# Patient Record
Sex: FEMALE | URBAN - METROPOLITAN AREA
[De-identification: names, ages, dates, MRNs, and addresses within clinical notes are randomized per-mention and may not be internally consistent; named-entity substitution may affect disease eponyms.]

---

## 2021-07-15 ENCOUNTER — APPOINTMENT (OUTPATIENT)
Dept: URBAN - METROPOLITAN AREA CLINIC 285 | Age: 21
Setting detail: DERMATOLOGY
End: 2021-07-15

## 2021-07-15 DIAGNOSIS — L24.2 IRRITANT CONTACT DERMATITIS DUE TO SOLVENTS: ICD-10-CM

## 2021-07-15 PROCEDURE — OTHER PRESCRIPTION: OTHER

## 2021-07-15 PROCEDURE — OTHER MIPS QUALITY: OTHER

## 2021-07-15 PROCEDURE — OTHER MEDICATION COUNSELING: OTHER

## 2021-07-15 PROCEDURE — OTHER TREATMENT REGIMEN: OTHER

## 2021-07-15 PROCEDURE — OTHER COUNSELING ALLERGENS: OTHER

## 2021-07-15 PROCEDURE — 99203 OFFICE O/P NEW LOW 30 MIN: CPT

## 2021-07-15 RX ORDER — BETAMETHASONE DIPROPIONATE 0.5 MG/G
OINTMENT TOPICAL
Qty: 1 | Refills: 0 | Status: ERX | COMMUNITY
Start: 2021-07-15

## 2021-07-15 RX ORDER — METHYLPREDNISOLONE 4 MG/1
TABLET ORAL
Qty: 1 | Refills: 0 | Status: ERX | COMMUNITY
Start: 2021-07-15

## 2021-07-15 ASSESSMENT — LOCATION ZONE DERM: LOCATION ZONE: ARM

## 2021-07-15 ASSESSMENT — LOCATION DETAILED DESCRIPTION DERM: LOCATION DETAILED: RIGHT PROXIMAL POSTERIOR UPPER ARM

## 2021-07-15 ASSESSMENT — LOCATION SIMPLE DESCRIPTION DERM: LOCATION SIMPLE: RIGHT POSTERIOR UPPER ARM

## 2021-07-15 NOTE — PROCEDURE: MEDICATION COUNSELING
Xelcatz Pregnancy And Lactation Text: This medication is Pregnancy Category D and is not considered safe during pregnancy.  The risk during breast feeding is also uncertain.

## 2021-07-15 NOTE — PROCEDURE: TREATMENT REGIMEN
Plan: Discussed how this may have resulted from cleanser and/or bandage used when patient had tattoo done.
Detail Level: Zone
Initiate Treatment: -betamethasone dipropionate 0.05 % topical ointment,Apply to affected areas on right upper arm twice daily , stop once clear\\n-Medrol (Bobby) 4 mg tablets in a dose pack , Take as directed

## 2022-06-01 NOTE — PROCEDURE: MEDICATION COUNSELING
Rhofade Counseling: Rhofade is a topical medication which can decrease superficial blood flow where applied. Side effects are uncommon and include stinging, redness and allergic reactions. Cartilage Graft Text: The defect edges were debeveled with a #15 scalpel blade.  Given the location of the defect, shape of the defect, the fact the defect involved a full thickness cartilage defect a cartilage graft was deemed most appropriate.  An appropriate donor site was identified, cleansed, and anesthetized. The cartilage graft was then harvested and transferred to the recipient site, oriented appropriately and then sutured into place.  The secondary defect was then repaired using a primary closure.

## 2024-03-23 NOTE — PROCEDURE: MEDICATION COUNSELING
1) Recommend continue with current diet, which remains appropriate at this time.   2) Monitor PO intake, Labs, weights, BMs, and skin integrity.   3) RD to remain available for further nutritional interventions as indicated.  Terbinafine Counseling: Patient counseling regarding adverse effects of terbinafine including but not limited to headache, diarrhea, rash, upset stomach, liver function test abnormalities, itching, taste/smell disturbance, nausea, abdominal pain, and flatulence.  There is a rare possibility of liver failure that can occur when taking terbinafine.  The patient understands that a baseline LFT and kidney function test may be required. The patient verbalized understanding of the proper use and possible adverse effects of terbinafine.  All of the patient's questions and concerns were addressed.

## 2024-07-01 NOTE — PROCEDURE: MEDICATION COUNSELING
Detail Level: Detailed Biopsy Photograph Reviewed: Yes Number Of Curettages: 3 Size Of Lesion In Cm: 0.8 Size Of Lesion After Curettage: 0 Add Intralesional Injection: No Concentration (Mg/Ml Or Millions Of Plaque Forming Units/Cc): 0.01 Total Volume (Ccs): 1 Anesthesia Type: 1% lidocaine with epinephrine and a 1:10 solution of 8.4% sodium bicarbonate Anesthesia Volume In Cc: 1.5 Cautery Type: electrodesiccation What Was Performed First?: Curettage Final Size Statement: The size of the lesion after curettage was Additional Information: (Optional): The wound was cleaned, and a pressure dressing was applied.  The patient received detailed post-op instructions. Consent was obtained from the patient. The risks, benefits and alternatives to therapy were discussed in detail. Specifically, the risks of infection, scarring, bleeding, prolonged wound healing, nerve injury, incomplete removal, allergy to anesthesia and recurrence were addressed. Alternatives to ED&C, such as: surgical removal and XRT were also discussed.  Prior to the procedure, the treatment site was clearly identified and confirmed by the patient. All components of Universal Protocol/PAUSE Rule completed. Post-Care Instructions: I reviewed with the patient in detail post-care instructions. Patient is to keep the area dry for 48 hours, and not to engage in any swimming until the area is healed. Should the patient develop any fevers, chills, bleeding, severe pain patient will contact the office immediately. Bill As A Line Item Or As Units: Line Item Topical Sulfur Applications Counseling: Topical Sulfur Counseling: Patient counseled that this medication may cause skin irritation or allergic reactions.  In the event of skin irritation, the patient was advised to reduce the amount of the drug applied or use it less frequently.   The patient verbalized understanding of the proper use and possible adverse effects of topical sulfur application.  All of the patient's questions and concerns were addressed.